# Patient Record
Sex: MALE | Race: WHITE | Employment: UNEMPLOYED | URBAN - METROPOLITAN AREA
[De-identification: names, ages, dates, MRNs, and addresses within clinical notes are randomized per-mention and may not be internally consistent; named-entity substitution may affect disease eponyms.]

---

## 2024-05-01 ENCOUNTER — OFFICE VISIT (OUTPATIENT)
Dept: RHEUMATOLOGY | Age: 29
End: 2024-05-01
Payer: COMMERCIAL

## 2024-05-01 VITALS
OXYGEN SATURATION: 98 % | WEIGHT: 229.28 LBS | DIASTOLIC BLOOD PRESSURE: 100 MMHG | HEART RATE: 121 BPM | SYSTOLIC BLOOD PRESSURE: 160 MMHG | BODY MASS INDEX: 31.05 KG/M2 | HEIGHT: 72 IN

## 2024-05-01 DIAGNOSIS — M1A.00X0 CHRONIC IDIOPATHIC GOUT: Primary | ICD-10-CM

## 2024-05-01 DIAGNOSIS — L20.82 FLEXURAL ECZEMA: ICD-10-CM

## 2024-05-01 DIAGNOSIS — R79.89 ELEVATED LFTS: ICD-10-CM

## 2024-05-01 PROCEDURE — G8427 DOCREV CUR MEDS BY ELIG CLIN: HCPCS | Performed by: INTERNAL MEDICINE

## 2024-05-01 PROCEDURE — 99214 OFFICE O/P EST MOD 30 MIN: CPT | Performed by: INTERNAL MEDICINE

## 2024-05-01 PROCEDURE — G8417 CALC BMI ABV UP PARAM F/U: HCPCS | Performed by: INTERNAL MEDICINE

## 2024-05-01 PROCEDURE — 4004F PT TOBACCO SCREEN RCVD TLK: CPT | Performed by: INTERNAL MEDICINE

## 2024-05-01 RX ORDER — TRIAMCINOLONE ACETONIDE 5 MG/G
CREAM TOPICAL
Qty: 15 G | Refills: 0 | Status: SHIPPED | OUTPATIENT
Start: 2024-05-01

## 2024-05-01 RX ORDER — ALLOPURINOL 100 MG/1
TABLET ORAL
Qty: 90 TABLET | Refills: 0 | Status: SHIPPED | OUTPATIENT
Start: 2024-05-01

## 2024-05-01 RX ORDER — COLCHICINE 0.6 MG/1
0.6 TABLET ORAL DAILY
Qty: 30 TABLET | Refills: 3 | Status: SHIPPED | OUTPATIENT
Start: 2024-05-01

## 2024-05-01 ASSESSMENT — ENCOUNTER SYMPTOMS
BLOOD IN STOOL: 0
SHORTNESS OF BREATH: 0
ABDOMINAL PAIN: 0
DIARRHEA: 1
COUGH: 0
WHEEZING: 1

## 2024-05-01 NOTE — PROGRESS NOTES
[facility]   Rheumatology Clinic Note      5/1/2024       CHIEF COMPLAINT:    Chief Complaint   Patient presents with    2 Week Follow-Up     Inflammatory arthritis      Joint Pain     Generalized pain  Pain level 9  No improvement with medication           HISTORY OF PRESENT ILLNESS:    28 y.o. male presents today for evaluation of positive EMMA, joint pain. When seen last, labs were ordered for further evaluation.    Continues to have excessive fatigue.  Has pain in his feet. Improved when being on prednisone.    RECAP:  Reports having pain , swelling and stiffness that has been present for past 4-5 years.  Episodes of skin rash on his hands. Rash in the back of the leg.    Recently, started having pain in the left wrist.  Swelling in the left ankle.  1-2 weeks, pain in the left knee.    Took steroid taper, helped his symptoms.  But pain recurred.  He is having difficulty walking due to pain and swelling.    Pain in lower back.  Morning stiffness lasts for an hour to all day.    Reports having thick rash on the hands. Flaky. Reports being diagnosed with psoriasis by dermatology few years ago. Managed with topical steroids.    Has excessive fatigue..  Has photosensitivity.  No oral ulcers.    Has acne on back and face.      Past Medical History:     has no past medical history on file.    Past Surgical History:     has a past surgical history that includes Upper gastrointestinal endoscopy.    Current Medications:      Current Outpatient Medications:     allopurinol (ZYLOPRIM) 100 MG tablet, Take one tab daily x 5 days, then 2 tab daily x 5 days, then 3 tab once daily thereafter, Disp: 90 tablet, Rfl: 0    colchicine (COLCRYS) 0.6 MG tablet, Take 1 tablet by mouth daily, Disp: 30 tablet, Rfl: 3    triamcinolone (ARISTOCORT) 0.5 % cream, Apply topically 2 times daily to affected area, Disp: 15 g, Rfl: 0    predniSONE (DELTASONE) 20 MG tablet, Take one tablet daily x 5 days as needed for gout flare ups.,

## 2024-05-02 ENCOUNTER — TELEPHONE (OUTPATIENT)
Dept: RHEUMATOLOGY | Age: 29
End: 2024-05-02

## 2024-05-02 DIAGNOSIS — M1A.00X0 CHRONIC IDIOPATHIC GOUT: ICD-10-CM

## 2024-05-02 NOTE — TELEPHONE ENCOUNTER
Please inform pt that insurance denied coverage for colchicine. They would only cover after he is on allopurinol for over 30 days.    I checked [employer]. If he uses this bebeto, he can get it for $25 at [employer] or [employer]. Please check with pt if he is in agreement to get the medication from there so we can send the prescription.

## 2024-05-02 NOTE — TELEPHONE ENCOUNTER
Colchicine Denied today  Coverage is provided when the member has had a 30-day trial (at maximally tolerated doses) of a preferred xanthine oxidase inhibitor which includes: allopurinol. The [facility] Policy for Medical Necessity as posted on the [facility] website and [facility] Preferred Drug List criteria were reviewed and per [place] Administrative Code Rule 5160-1-01 (C) and 5160-26-03 (B), a medically necessary service must include: generally accepted standards of medical practice, be clinically appropriate in administration, treatment and outcome and be the lowest cost alternative to effectively treat the condition. Please contact your provider to assist you with other treatment options that might be covered under your benefit package, or other services that might be available through the community.    Denial letter scanned in. Please advise. Thank you.

## 2024-05-02 NOTE — TELEPHONE ENCOUNTER
Colchicine prior auth submitted via CMM:     Tanvir Landers (Key: [id number])  PA Case ID #: [id number]  Rx #: [id number]  Need Help? Call us at [phone]  Status  Sent to Plan today  Drug  Colchicine 0.6MG tablets    Form  [place] Medicaid [employer] Electronic PA Form (2017 NCPDP)  Original Claim Info  75 0211D:PA REQUIRED-CONTACT "[employer]" DESK @ [phone]

## 2024-05-08 NOTE — TELEPHONE ENCOUNTER
Third attempt. Left voice message for patient to return call to office. Letter sent to address on profile.

## 2024-05-09 ENCOUNTER — TELEPHONE (OUTPATIENT)
Dept: RHEUMATOLOGY | Age: 29
End: 2024-05-09

## 2024-05-09 NOTE — TELEPHONE ENCOUNTER
Attempted to contact the pt, no answer. [facility] has faxed a letter to the office because they have not been able to reach the pt to schedule a consultation. Left detailed message for the pt w/ [facility] number. Letter will be attached to this encounter.

## 2024-06-20 ENCOUNTER — TELEPHONE (OUTPATIENT)
Dept: RHEUMATOLOGY | Age: 29
End: 2024-06-20

## 2024-06-20 NOTE — TELEPHONE ENCOUNTER
PROVIDER FEEDBACK LOOP CALLED 3X US     Patient:Tanvir Landers  : 1995  Referring Provider: ROSALIA BANGURA  Referral Type:  Imaging     Procedures:  [id number] - US ABDOMEN COMPLETE  Date Service Ordered 2024        We were unable to reach Tanvir Landers to schedule the Ultrasound ordered by your office after 3 outreach attempts via either text, email and/or phone call.  Please call/follow up with your patient to explain the significance of the ordered test and direct the patient to self-schedule via [employer].     Please complete one of the following actions from Quick Actions buttons:     Route to Provider:  Route message to ordering provider to seek next steps in care plan.     Telephone Encounter:  Telephone encounter will open.  Call patient to explain significance of ordered test and direct patient to call Central Scheduling to schedule test then Document details of call.     Open Referral:  Review referral notes or details if needed.     Close Referral:  Referral will open.  Document in Notes section of referral why the referral is being closed.  Examples of referral closure:  Patient had test done outside of of an office in the [facility], Patient refuses test, Patient no longer having symptoms, Unable to reach patient.  Only close the referral if you are sure the test will not proceed.     Thank you,     Pre-Access Scheduling Team        Attempted to reach. LVM informing pt of central schedulings number.